# Patient Record
Sex: FEMALE | URBAN - METROPOLITAN AREA
[De-identification: names, ages, dates, MRNs, and addresses within clinical notes are randomized per-mention and may not be internally consistent; named-entity substitution may affect disease eponyms.]

---

## 2021-06-23 ENCOUNTER — OFFICE VISIT (OUTPATIENT)
Dept: PLASTIC SURGERY | Facility: CLINIC | Age: 66
End: 2021-06-23
Payer: COMMERCIAL

## 2021-06-23 DIAGNOSIS — J34.89 NASAL VALVE STENOSIS: ICD-10-CM

## 2021-06-23 DIAGNOSIS — J34.89 NASAL OBSTRUCTION: ICD-10-CM

## 2021-06-23 DIAGNOSIS — M95.0 ACQUIRED DEFORMITY OF NOSE: Primary | ICD-10-CM

## 2021-06-23 NOTE — LETTER
June 23, 2021  Re: Ivy Henry  1955    Dear Dr. Torres ref. provider found,    Thank you so much for referring Ivy Henry to the [facility]. I had the pleasure of visiting with Ivy today.     Attached you will find a copy of my note. Please feel free to reach out to me with any questions, [phone].     This office note has been dictated.         Your trust in our practice and care is much appreciated.    Sincerely,  CHADD JAIME MD

## 2021-06-23 NOTE — PATIENT INSTRUCTIONS
Photo documentation obtained. Will submit case for prior authorization and be in contact with patient.

## 2021-06-23 NOTE — LETTER
6/23/2021       RE: Ivy Henry  [address] [place] [address]     Dear Colleague,    Thank you for referring your patient, Ivy Henry, to the [facility] at [facility]. Please see a copy of my visit note below.    This office note has been dictated.       Again, thank you for allowing me to participate in the care of your patient.      Sincerely,    CHADD JAIME MD

## 2021-06-24 DIAGNOSIS — J34.89 NASAL OBSTRUCTION: ICD-10-CM

## 2021-06-24 DIAGNOSIS — J34.829 NASAL VALVE COLLAPSE: Primary | ICD-10-CM

## 2021-08-09 ENCOUNTER — TELEPHONE (OUTPATIENT)
Dept: PLASTIC SURGERY | Facility: CLINIC | Age: 66
End: 2021-08-09

## 2021-08-09 NOTE — TELEPHONE ENCOUNTER
I spoke with the patient to schedule her surgery with Dr. Nicholas 10/14/21 at [facility]. The patient is aware that she will need to have a pre-op physical and will bring her proof of COVID vaccination to the surgery center. Surgery orders were faxed to [facility] and a surgery packet was mailed to the patient.

## 2021-09-29 ENCOUNTER — TRANSFERRED RECORDS (OUTPATIENT)
Dept: HEALTH INFORMATION MANAGEMENT | Facility: CLINIC | Age: 66
End: 2021-09-29

## 2021-10-13 ENCOUNTER — OFFICE VISIT (OUTPATIENT)
Dept: PLASTIC SURGERY | Facility: CLINIC | Age: 66
End: 2021-10-13
Payer: COMMERCIAL

## 2021-10-13 DIAGNOSIS — Z98.890 POSTOPERATIVE STATE: Primary | ICD-10-CM

## 2021-10-13 NOTE — PROGRESS NOTES
The patient's sutures and nasal cones were removed by the RN without any complications. She is very pleased with the results. Incision care was done and aquaphor applied. Per the patient she does not wish to follow up in clinic anymore because her insurance only covers one visit and distance is a factor for her. I will discuss this with Dr. Nicholas.

## 2021-10-28 ENCOUNTER — VIRTUAL VISIT (OUTPATIENT)
Dept: PLASTIC SURGERY | Facility: CLINIC | Age: 66
End: 2021-10-28
Payer: COMMERCIAL

## 2021-10-28 DIAGNOSIS — Z98.890 POSTOPERATIVE STATE: Primary | ICD-10-CM

## 2021-10-28 NOTE — LETTER
October 28, 2021  Re: Ivy Henry  1955    Dear Dr. Farrell,    Thank you so much for referring Ivy Henry to the [facility]. I had the pleasure of visiting with Ivy today.     Attached you will find a copy of my note. Please feel free to reach out to me with any questions, [phone].     This office note has been dictated.         Your trust in our practice and care is much appreciated.    Sincerely,  CHADD JAIME MD

## 2021-10-28 NOTE — LETTER
Date:December 24, 2021      Patient was self referred, no letter generated. Do not send.        [facility] Information

## 2021-10-28 NOTE — LETTER
10/28/2021       RE: Ivy Henry  [address]  [facility] [address]     Dear Colleague,    Thank you for referring your patient, Ivy Henry, to the [facility] at [facility]. Please see a copy of my visit note below.    This office note has been dictated.       Again, thank you for allowing me to participate in the care of your patient.      Sincerely,    CHADD JAIME MD

## 2021-10-28 NOTE — LETTER
Date:December 24, 2021      Patient was self referred, no letter generated. Do not send.        [facility] Health Information

## 2021-10-28 NOTE — PROGRESS NOTES
Service Date: 10/28/2021    Ms. Stern and I connected on the phone today.  She is absolutely delighted with her airway result.  The nose is still swollen as expected.  She has a couple of dissolvable sutures that we will then have her trim off that are annoying.  She is overall very pleased.  I am going to have her continue to use stents for another couple of months.  I will check in with her in two months.  Her phone is old enough that we cannot do a formal video consultation so I will have her email pictures of selfies today and before her next visit.  She is comfortable with that approach.      Jef Nicholas MD        D: 10/28/2021   T: 10/28/2021   MT: ms    Name:     ADOLPH STERN  MRN:      [id number]        Account:      [id number]   :      1955           Service Date: 10/28/2021       Document: [id number]

## 2022-02-17 ENCOUNTER — VIRTUAL VISIT (OUTPATIENT)
Dept: PLASTIC SURGERY | Facility: CLINIC | Age: 67
End: 2022-02-17
Payer: COMMERCIAL

## 2022-02-17 NOTE — Clinical Note
2022       RE: Ivy Butler  [address] [facility] [address]     Dear Colleague,    Thank you for referring your [facility], Ivy Butler, [facility]. Please see a copy of my visit note below.    Service Date: 2022    Ms. Butler is back today by phone consultation.  She breathes comfortably through her nose.  She can discontinue the cones.  She says there are suture lines on the outside she would like to have better but overall, she is delighted with the outcome.  I told her that the resurfacing the scar line is still an option.  She will reflect on that.  She will be in touch with us as needed.      Chadd Nicholas MD        D: 2022   T: 2022   MT: ms    Name:     IVY BUTLER  MRN:      [id number]        Account:      [id number]   :      1955           Service Date: 2022       Document: [id number]      Again, thank you for allowing me to participate in the care of your patient.      Sincerely,    CHADD NICHOLAS MD

## 2022-02-17 NOTE — Clinical Note
2022  Re: Ivy Butler  1955    Dear Dr. Farrell,    Thank you so much for referring Ivy Butler to the [facility]. I had the pleasure of visiting with Ivy today.     Attached you will find a copy of my note. Please feel free to reach out to me with any questions, [phone].     Service Date: 2022    Ms. Butler is back today by phone consultation.  She breathes comfortably through her nose.  She can discontinue the cones.  She says there are suture lines on the outside she would like to have better but overall, she is delighted with the outcome.  I told her that the resurfacing the scar line is still an option.  She will reflect on that.  She will be in touch with us as needed.      Jef Nicholas MD        D: 2022   T: 2022   MT: ms    Name:     IVY BUTLER  MRN:      [id number]        Account:      [id number]   :      1955           Service Date: 2022       Document: [id number]        Your trust in our practice and care is much appreciated.    Sincerely,  JEF NICHOLAS MD

## 2022-02-17 NOTE — LETTER
Date:February 21, 2022      Patient was self referred, no letter generated. Do not send.        [facility] Health Information

## 2022-02-17 NOTE — LETTER
Date:February 21, 2022      Patient was self referred, no letter generated. Do not send.        [facility] Health Information

## 2022-02-19 NOTE — PROGRESS NOTES
Service Date: 2022    Ms. Stern is back today by phone consultation.  She breathes comfortably through her nose.  She can discontinue the cones.  She says there are suture lines on the outside she would like to have better but overall, she is delighted with the outcome.  I told her that the resurfacing the scar line is still an option.  She will reflect on that.  She will be in touch with us as needed.      Jef Nicholas MD        D: 2022   T: 2022   MT: ms    Name:     ADOLPH STERN  MRN:      [id number]        Account:      [id number]   :      1955           Service Date: 2022       Document: [id number]
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
